# Patient Record
Sex: MALE | Race: WHITE | NOT HISPANIC OR LATINO | Employment: FULL TIME | URBAN - METROPOLITAN AREA
[De-identification: names, ages, dates, MRNs, and addresses within clinical notes are randomized per-mention and may not be internally consistent; named-entity substitution may affect disease eponyms.]

---

## 2020-02-11 ENCOUNTER — COMMUNICATION - HEALTHEAST (OUTPATIENT)
Dept: SCHEDULING | Facility: CLINIC | Age: 40
End: 2020-02-11

## 2020-02-11 ENCOUNTER — OFFICE VISIT - HEALTHEAST (OUTPATIENT)
Dept: FAMILY MEDICINE | Facility: CLINIC | Age: 40
End: 2020-02-11

## 2020-02-11 ENCOUNTER — COMMUNICATION - HEALTHEAST (OUTPATIENT)
Dept: TELEHEALTH | Facility: CLINIC | Age: 40
End: 2020-02-11

## 2020-02-11 DIAGNOSIS — H10.33 ACUTE CONJUNCTIVITIS OF BOTH EYES, UNSPECIFIED ACUTE CONJUNCTIVITIS TYPE: ICD-10-CM

## 2020-02-11 ASSESSMENT — MIFFLIN-ST. JEOR: SCORE: 1806.48

## 2020-12-11 ENCOUNTER — TRANSFERRED RECORDS (OUTPATIENT)
Dept: HEALTH INFORMATION MANAGEMENT | Facility: CLINIC | Age: 40
End: 2020-12-11

## 2021-06-04 VITALS
WEIGHT: 189.31 LBS | DIASTOLIC BLOOD PRESSURE: 80 MMHG | TEMPERATURE: 99 F | HEIGHT: 72 IN | SYSTOLIC BLOOD PRESSURE: 126 MMHG | HEART RATE: 64 BPM | BODY MASS INDEX: 25.64 KG/M2 | RESPIRATION RATE: 16 BRPM

## 2021-06-06 NOTE — PROGRESS NOTES
"Assessment / Impression     1. Acute conjunctivitis of both eyes, unspecified acute conjunctivitis type  tobramycin (TOBREX) 0.3 % ophthalmic solution         Plan:     Discussed with patient possible causes of conjunctivitis, including viral versus bacterial.  Discussed with patient that even with bacterial conjunctivitis, may improve without treatment with antibiotics, however given symptoms are worse in right eye today, prescription for ophthalmic tobramycin drops were given.  Discussed use, importance of washing hands to decrease possibility of spread to others.  Follow-up in 10 days if symptoms persist, sooner if they worsen.  Patient understands, agrees with plan.    Return in about 10 days (around 2/21/2020), or if symptoms worsen or fail to improve.    Subjective:      HPI: Sergio Bacon is a 39 y.o. male, who hasn't been seen in family medicine in over 3 years, who presents for \"possible pinkeye concerns\".  His symptoms started 3 days ago where he noted redness initially of both eyes with purulent drainage.  He has been using over-the-counter lubricant eye wetting drops, and yesterday he felt like his symptoms improved, however this morning woke up with right eye more erythematous than the left and significant yellow crustiness.  He has also had some cold symptoms.  Denies any eye pain, vision changes, or photophobia.  No fevers.      Medical History:     Patient Active Problem List   Diagnosis     Malignant Melanoma Of The Skin     Dysplastic Nevus     Allergies     Acute Bronchitis       History reviewed. No pertinent past medical history.    Past Surgical History:   Procedure Laterality Date     NO PAST SURGERIES         Current Medications:     Current Outpatient Medications   Medication Sig     multivitamin (MULTIPLE VITAMIN ORAL) Take by mouth.     tobramycin (TOBREX) 0.3 % ophthalmic solution Administer 1 drop to both eyes every 4 (four) hours for 10 days.       Family History:   History reviewed. " "No pertinent family history.    Review of Systems  All other systems reviewed and are negative.         Social History:     Social History     Tobacco Use   Smoking Status Never Smoker   Smokeless Tobacco Never Used     Social History     Social History Narrative    Works at [employer]         Objective:     /80 (Patient Site: Left Arm, Patient Position: Sitting, Cuff Size: Adult Large)   Pulse 64   Temp 99  F (37.2  C) (Oral)   Resp 16   Ht 6' 0.3\" (1.836 m)   Wt 189 lb 5 oz (85.9 kg)   BMI 25.46 kg/m    Physical Examination: General appearance - alert, well appearing, and in no distress  Eyes: extraocular eye movements intact  Ears: normal external ears, clear canals,  Left TM appears normal, with no redness or bulging noted.  Right TM appears normal, with no redness or bulging noted.  Mouth: mucous membranes moist, pharynx normal without lesions  Neck: supple, no significant adenopathy or thyromegaly  Lungs: clear to auscultation, no wheezes, rales or rhonchi, symmetric air entry  Heart: normal rate, regular rhythm, normal S1, S2, no murmurs.  Abdomen: soft, nontender, nondistended, no masses or organomegaly  Neurological: alert, oriented, normal speech, no focal findings or movement disorder noted.    Extremities: No edema, no clubbing or cyanosis  Psychiatric: Normal affect. Does not appear anxious or depressed.    No results found for this or any previous visit (from the past 168 hour(s)).      Yazmin Jenkins MD  2/11/2020  9:30 AM        "

## 2021-06-06 NOTE — TELEPHONE ENCOUNTER
Reason for Disposition    Patient wants to be seen    Protocols used: EYE - PUS OR [id number]

## 2021-06-06 NOTE — TELEPHONE ENCOUNTER
Woke up with pink eye Saturday.  Persistent.    Lid is bit swollen. Just puss in morning now.      Son had viral pink eye.    No pain or blurred vision.    Using lube eye drops.    Advised him medication cannot be sent in as he requests as he has never been seen by pcp or within [facility] system.    Prefers [facility] clinic.    Transferred to scheduling for an appointment.    Carmen Yoder RN  Triage Nurse Advisor

## 2021-08-21 ENCOUNTER — HEALTH MAINTENANCE LETTER (OUTPATIENT)
Age: 41
End: 2021-08-21

## 2021-10-16 ENCOUNTER — HEALTH MAINTENANCE LETTER (OUTPATIENT)
Age: 41
End: 2021-10-16

## 2021-12-27 ENCOUNTER — IMMUNIZATION (OUTPATIENT)
Dept: NURSING | Facility: CLINIC | Age: 41
End: 2021-12-27
Payer: COMMERCIAL

## 2021-12-27 PROCEDURE — 91300 PR COVID VAC PFIZER DIL RECON 30 MCG/0.3 ML IM: CPT

## 2021-12-27 PROCEDURE — 0004A PR COVID VAC PFIZER DIL RECON 30 MCG/0.3 ML IM: CPT

## 2022-09-25 ENCOUNTER — HEALTH MAINTENANCE LETTER (OUTPATIENT)
Age: 42
End: 2022-09-25

## 2023-04-08 ENCOUNTER — HOSPITAL ENCOUNTER (EMERGENCY)
Facility: HOSPITAL | Age: 43
Discharge: HOME | End: 2023-04-08
Attending: EMERGENCY MEDICINE | Admitting: EMERGENCY MEDICINE
Payer: COMMERCIAL

## 2023-04-08 VITALS
BODY MASS INDEX: 23.1 KG/M2 | HEIGHT: 74 IN | SYSTOLIC BLOOD PRESSURE: 130 MMHG | TEMPERATURE: 99.5 F | DIASTOLIC BLOOD PRESSURE: 94 MMHG | RESPIRATION RATE: 20 BRPM | OXYGEN SATURATION: 94 % | WEIGHT: 180 LBS | HEART RATE: 72 BPM

## 2023-04-08 DIAGNOSIS — T18.108A FOREIGN BODY IN ESOPHAGUS, INITIAL ENCOUNTER: ICD-10-CM

## 2023-04-08 LAB
ANION GAP SERPL CALCULATED.3IONS-SCNC: 11 MMOL/L (ref 7–15)
BASOPHILS # BLD AUTO: 0.1 10E3/UL (ref 0–0.2)
BASOPHILS NFR BLD AUTO: 1 %
BUN SERPL-MCNC: 15.4 MG/DL (ref 6–20)
CALCIUM SERPL-MCNC: 8.6 MG/DL (ref 8.6–10)
CHLORIDE SERPL-SCNC: 105 MMOL/L (ref 98–107)
CREAT SERPL-MCNC: 0.92 MG/DL (ref 0.67–1.17)
DEPRECATED HCO3 PLAS-SCNC: 23 MMOL/L (ref 22–29)
EOSINOPHIL # BLD AUTO: 0.1 10E3/UL (ref 0–0.7)
EOSINOPHIL NFR BLD AUTO: 1 %
ERYTHROCYTE [DISTWIDTH] IN BLOOD BY AUTOMATED COUNT: 11.9 % (ref 10–15)
GFR SERPL CREATININE-BSD FRML MDRD: >90 ML/MIN/1.73M2
GLUCOSE SERPL-MCNC: 100 MG/DL (ref 70–99)
HCT VFR BLD AUTO: 44.1 % (ref 40–53)
HGB BLD-MCNC: 15.2 G/DL (ref 13.3–17.7)
IMM GRANULOCYTES # BLD: 0 10E3/UL
IMM GRANULOCYTES NFR BLD: 0 %
LYMPHOCYTES # BLD AUTO: 1.2 10E3/UL (ref 0.8–5.3)
LYMPHOCYTES NFR BLD AUTO: 14 %
MCH RBC QN AUTO: 29.2 PG (ref 26.5–33)
MCHC RBC AUTO-ENTMCNC: 34.5 G/DL (ref 31.5–36.5)
MCV RBC AUTO: 85 FL (ref 78–100)
MONOCYTES # BLD AUTO: 0.5 10E3/UL (ref 0–1.3)
MONOCYTES NFR BLD AUTO: 5 %
NEUTROPHILS # BLD AUTO: 7 10E3/UL (ref 1.6–8.3)
NEUTROPHILS NFR BLD AUTO: 79 %
NRBC # BLD AUTO: 0 10E3/UL
NRBC BLD AUTO-RTO: 0 /100
PLATELET # BLD AUTO: 207 10E3/UL (ref 150–450)
POTASSIUM SERPL-SCNC: 4 MMOL/L (ref 3.4–5.3)
RBC # BLD AUTO: 5.21 10E6/UL (ref 4.4–5.9)
SODIUM SERPL-SCNC: 139 MMOL/L (ref 136–145)
WBC # BLD AUTO: 8.8 10E3/UL (ref 4–11)

## 2023-04-08 PROCEDURE — 96360 HYDRATION IV INFUSION INIT: CPT

## 2023-04-08 PROCEDURE — 96374 THER/PROPH/DIAG INJ IV PUSH: CPT

## 2023-04-08 PROCEDURE — 80048 BASIC METABOLIC PNL TOTAL CA: CPT | Performed by: EMERGENCY MEDICINE

## 2023-04-08 PROCEDURE — 258N000003 HC RX IP 258 OP 636: Performed by: EMERGENCY MEDICINE

## 2023-04-08 PROCEDURE — 99284 EMERGENCY DEPT VISIT MOD MDM: CPT | Mod: 25

## 2023-04-08 PROCEDURE — 36415 COLL VENOUS BLD VENIPUNCTURE: CPT | Performed by: EMERGENCY MEDICINE

## 2023-04-08 PROCEDURE — 250N000013 HC RX MED GY IP 250 OP 250 PS 637: Performed by: EMERGENCY MEDICINE

## 2023-04-08 PROCEDURE — 85004 AUTOMATED DIFF WBC COUNT: CPT | Performed by: EMERGENCY MEDICINE

## 2023-04-08 PROCEDURE — 96361 HYDRATE IV INFUSION ADD-ON: CPT

## 2023-04-08 PROCEDURE — 250N000011 HC RX IP 250 OP 636: Performed by: EMERGENCY MEDICINE

## 2023-04-08 RX ADMIN — GLUCAGON HYDROCHLORIDE 1 MG: 1 INJECTION, POWDER, FOR SOLUTION INTRAMUSCULAR; INTRAVENOUS; SUBCUTANEOUS at 18:06

## 2023-04-08 RX ADMIN — SODIUM CHLORIDE 1000 ML: 9 INJECTION, SOLUTION INTRAVENOUS at 18:07

## 2023-04-08 RX ADMIN — ANTACID/ANTIFLATULENT 4 G: 380; 550; 10; 10 GRANULE, EFFERVESCENT ORAL at 18:12

## 2023-04-08 ASSESSMENT — ACTIVITIES OF DAILY LIVING (ADL)
ADLS_ACUITY_SCORE: 35

## 2023-04-08 NOTE — ED PROVIDER NOTES
EMERGENCY DEPARTMENT ENCOUNTER      NAME: Sergio Bacon  AGE: 42 year old male  YOB: 1980  MRN: [id number]  EVALUATION DATE & TIME: No admission date for patient encounter.    PCP: Yamila Alonso    ED PROVIDER: Madeline Carroll M.D.      Chief Complaint   Patient presents with     Swallowed Foreign Body         FINAL IMPRESSION:  1. Foreign body in esophagus, initial encounter          ED COURSE & MEDICAL DECISION MAKING:    ED Course as of 04/08/23 2328   Sat Apr 08, 2023   1748 Pt with approx 4pm sense of esophageal FB while swallowing chicken and can localize it, no respiratory distress, normal VS, not tolerating saliva. RN on break, covering RN busy, thus will attempt soda in lieu of EZ gas now, GI paged   1800 EZ gas ineffective, patient tried drinking soda while jumping up and down without relief   1818 I spoke with Dr White from [facility] who will take patient to OR and either PACU vs. ER discharge, at [place] now and will take a few min to get here likely   2102 Pt unable to go to OR as all Ors currently occupied until 10pm, booked for 10pm   2314 Pt now notes he feels like the food bolus has gone down and STILL waiting for OR space for endsocopy given ongoing cases, will PO challenge now.   2321 Pt tolerating PO which is reassuring, discharged with GI follow up to discuss whether he may need an endoscopy in the future and [facility] paged to update that patient's food bolus self resolved. Patient discharged after being provided with extensive anticipatory guidance and given return precautions, importance of PMD follow-up emphasized.    2328 I spoke with Dr White who will personally reach out to patient through office to make sure he is able to schedule upper endoscopy       Pertinent Labs & Imaging studies reviewed. (See chart for details)    N95 worn  A face shield was worn also  COVID PPE    Medical Decision Making    History:    Supplemental history from: Documented in chart, if  applicable    External Record(s) reviewed: Documented in chart, if applicable.    Work Up:    Chart documentation includes differential considered and any EKGs or imaging independently interpreted by provider, where specified.    In additional to work up documented, I considered the following work up: Documented in chart, if applicable.    External consultation:    Discussion of management with another provider: Gastroenterology    Complicating factors:    Care impacted by chronic illness: N/A    Care affected by social determinants of health: N/A    Disposition considerations: Discharge. I recommended the patient continue their current prescription strength medication(s): multivitamin. I considered admission, but discharged patient after significant clinical improvement.        At the conclusion of the encounter I discussed the results of all of the tests and the disposition. The questions were answered. The patient or family acknowledged understanding and was agreeable with the care plan.     MEDICATIONS GIVEN IN THE EMERGENCY:  Medications   sod bicarbonate-citric acid-simethicone (EZ GAS) 2.21-1.53-0.04 g packet 4 g (4 g Oral $Given 4/8/23 1812)   glucagon injection 1 mg (1 mg Intravenous $Given 4/8/23 1806)   0.9% sodium chloride BOLUS (0 mLs Intravenous Stopped 4/8/23 1915)       NEW PRESCRIPTIONS STARTED AT TODAY'S ER VISIT  New Prescriptions    No medications on file          =================================================================    HPI      Sergio Bacon is a 42 year old male with no pertinent PMHx who presents to the ED today by walking for swallowed foreign body.     At ~4:00 PM today, patient was grilling chicken and when he went to taste it, the piece of chicken did not go all the way down. Notes that every time he tries to swallow, it feels as if he needs to throw up. States this has never happened before. Otherwise denies shortness of breath or any other complaints at this time. Denies a  "history of acid reflux or any other medical issues.     REVIEW OF SYSTEMS   All other systems reviewed and are negative except as noted above in HPI.    PAST MEDICAL HISTORY:  History reviewed. No pertinent past medical history.    PAST SURGICAL HISTORY:  Past Surgical History:   Procedure Laterality Date     NO PAST SURGERIES         CURRENT MEDICATIONS:    multivitamin (MULTIPLE VITAMIN ORAL)        ALLERGIES:  No Known Allergies    FAMILY HISTORY:  History reviewed. No pertinent family history.    SOCIAL HISTORY:   Social History     Socioeconomic History     Marital status:    Tobacco Use     Smoking status: Never     Smokeless tobacco: Never   Substance and Sexual Activity     Alcohol use: Yes     Comment: Alcoholic Drinks/day: occasional     Drug use: Never   Social History Narrative    Works at [employer]       VITALS:  Patient Vitals for the past 24 hrs:   BP Temp Temp src Pulse Resp SpO2 Height Weight   04/08/23 2130 (!) 130/94 -- -- 72 -- 94 % -- --   04/08/23 2100 136/89 -- -- 67 -- 93 % -- --   04/08/23 1930 (!) 140/87 -- -- 76 -- 94 % -- --   04/08/23 1914 -- -- -- 73 -- 96 % -- --   04/08/23 1900 (!) 149/84 -- -- 75 -- 95 % -- --   04/08/23 1822 -- -- -- 82 -- 97 % -- --   04/08/23 1818 (!) 168/81 -- -- -- -- -- -- --   04/08/23 1817 -- -- -- 95 -- 96 % -- --   04/08/23 1815 -- -- -- 98 -- 96 % -- --   04/08/23 1810 (!) 177/98 -- -- 110 -- 95 % -- --   04/08/23 1724 (!) 141/83 99.5  F (37.5  C) Oral 92 20 96 % 1.88 m (6' 2\") 81.6 kg (180 lb)       PHYSICAL EXAM    GENERAL: Awake, alert.  In no acute distress.   HEENT: Normocephalic, atraumatic.  Pupils equal, round and reactive.  Conjunctiva normal.  EOMI.  NECK: No stridor or apparent deformity.  PULMONARY: Symmetrical breath sounds without distress.  Lungs clear to auscultation bilaterally without wheezes, rhonchi or rales.  CARDIO: Regular rate and rhythm.  No significant murmur, rub or gallop.  Radial pulses strong and " symmetrical.  ABDOMINAL: Abdomen soft, non-distended and non-tender to palpation.  No CVAT, no palpable hepatosplenomegaly.  EXTREMITIES: No lower extremity swelling or edema.    NEURO: Alert and oriented to person, place and time.  Cranial nerves grossly intact.  No focal motor deficit.  PSYCH: Normal mood and affect  SKIN: No rashes      LAB:  All pertinent labs reviewed and interpreted.  Results for orders placed or performed during the hospital encounter of 04/08/23   Basic metabolic panel   Result Value Ref Range    Sodium 139 136 - 145 mmol/L    Potassium 4.0 3.4 - 5.3 mmol/L    Chloride 105 98 - 107 mmol/L    Carbon Dioxide (CO2) 23 22 - 29 mmol/L    Anion Gap 11 7 - 15 mmol/L    Urea Nitrogen 15.4 6.0 - 20.0 mg/dL    Creatinine 0.92 0.67 - 1.17 mg/dL    Calcium 8.6 8.6 - 10.0 mg/dL    Glucose 100 (H) 70 - 99 mg/dL    GFR Estimate >90 >60 mL/min/1.73m2   CBC with platelets and differential   Result Value Ref Range    WBC Count 8.8 4.0 - 11.0 10e3/uL    RBC Count 5.21 4.40 - 5.90 10e6/uL    Hemoglobin 15.2 13.3 - 17.7 g/dL    Hematocrit 44.1 40.0 - 53.0 %    MCV 85 78 - 100 fL    MCH 29.2 26.5 - 33.0 pg    MCHC 34.5 31.5 - 36.5 g/dL    RDW 11.9 10.0 - 15.0 %    Platelet Count 207 150 - 450 10e3/uL    % Neutrophils 79 %    % Lymphocytes 14 %    % Monocytes 5 %    % Eosinophils 1 %    % Basophils 1 %    % Immature Granulocytes 0 %    NRBCs per 100 WBC 0 <1 /100    Absolute Neutrophils 7.0 1.6 - 8.3 10e3/uL    Absolute Lymphocytes 1.2 0.8 - 5.3 10e3/uL    Absolute Monocytes 0.5 0.0 - 1.3 10e3/uL    Absolute Eosinophils 0.1 0.0 - 0.7 10e3/uL    Absolute Basophils 0.1 0.0 - 0.2 10e3/uL    Absolute Immature Granulocytes 0.0 <=0.4 10e3/uL    Absolute NRBCs 0.0 10e3/uL           Yamila DEMPSEY, am serving as a scribe to document services personally performed by Dr. Madeline Carroll based on my observation and the provider's statements to me. I, Madeline Carroll MD attest that Yamila Granados is acting in a scribe capacity, has  observed my performance of the services and has documented them in accordance with my direction.     Madeline Carroll MD  04/08/23 9460       Madeline Carroll MD  04/08/23 2825

## 2023-04-08 NOTE — ED TRIAGE NOTES
Pt arrives c/o chicken stuck in throat. States he was grilling chicken when he went to taste it and chicken did not go all the way down. Any water he drinks comes right back up. Airway intact.

## 2023-11-19 ENCOUNTER — HEALTH MAINTENANCE LETTER (OUTPATIENT)
Age: 43
End: 2023-11-19

## 2023-12-12 ASSESSMENT — ENCOUNTER SYMPTOMS
PALPITATIONS: 0
CONSTIPATION: 0
FREQUENCY: 0
JOINT SWELLING: 0
HEARTBURN: 0
WEAKNESS: 0
ABDOMINAL PAIN: 0
COUGH: 0
HEADACHES: 0
NAUSEA: 0
EYE PAIN: 0
ARTHRALGIAS: 0
SORE THROAT: 0
PARESTHESIAS: 0
DIARRHEA: 0
MYALGIAS: 0
HEMATURIA: 0
NERVOUS/ANXIOUS: 0
DIZZINESS: 0
DYSURIA: 0
HEMATOCHEZIA: 0
CHILLS: 0
FEVER: 0
SHORTNESS OF BREATH: 0

## 2023-12-12 NOTE — COMMUNITY RESOURCES LIST (ENGLISH)
12/12/2023   [facility]  N/A  For questions about this resource list or additional care needs, please contact your primary care clinic or care manager.  Phone: [phone]   Email: N/A   Address: [address] [place] [address]   Hours: N/A        Hotlines and Helplines       Hotline - Housing crisis  1  [facility] Distance: 9.58 miles      Phone/Virtual   [address] [place] [address]  Language: English  Hours: Mon - Sun Open 24 Hours   Phone: (768) 847-7050 Email: communications@[facility].org Website: https://[facility]-[place].org/oursaviourshousing/     2  [facility] Distance: 10.75 miles      Phone/Virtual   [address] [place] [address]  Language: English  Hours: Mon - Sun Open 24 Hours   Phone: (307) 430-7125 Email: info@[facility] Website: http://www.[facility].org          Housing       Coordinated Entry access point  3  [facility] Distance: 6.64 miles      Phone/Virtual   [address] [address]  Language: English  Hours: Mon - Fri 8:30 AM - 12:00 PM , Mon - Fri 1:00 PM - 4:00 PM  Fees: Free   Phone: (975) 289-6529 Ext.2 Email: kaylan@[facility].org Website: https://www.[facility].org/usn/     4  [facility] Human [facility] - Coordinated Access to Housing and Shelter ([facility]) - Coordinated Access - Coordinated Entry access point Distance: 7.86 miles      In-Person, Phone/Virtual   [address] [place] [address]  Language: English  Hours: Mon - Fri 8:00 AM - 4:30 PM  Fees: Free   Phone: (366) 570-3956 Website: https://www.[facility]./residents/assistance-support/assistance/housing-services-support     Drop-in center or day shelter  5  [facility] Distance: 8.17 miles      In-Person   [address] [place] [address]  Language: English  Hours: Mon - Fri 9:00 AM - 4:00 PM  Fees: Free   Phone: (528)  090-9668 Email: frontcherryk@[employer].org Website: http://[employer].org     6  [facility] Distance: 9.14 miles      In-Person   [address] [place] [address]  Language: English, Hmong, Honduran, Mauritian  Hours: Mon - Thu 8:30 AM - 4:30 PM , Sat - Sun 9:00 AM - 12:00 PM  Fees: Free   Phone: (293) 356-6939 Email: info@[employer] Website: https://[employer].org/     Housing search assistance  7  [facility] - Mental Health Crisis Housing Search Assistance Distance: 7.91 miles      In-Person, Phone/Virtual   [address] [facility] [address] [place] [address]  Language: English  Hours: Mon - Tue 8:00 AM - 4:30 PM , Wed 8:00 AM - 6:00 PM , Thu - Fri 8:00 AM - 4:30 PM  Fees: Free   Phone: (511) 532-2900 Email: [facility]@co.[facility]. Website: https://www.[facility]./residents/health-medical/clinics-services/mental-health/adult-mental-health     8  Neighborhood Assistance [employer] [facility] [employer] Distance: 8.14 miles      Phone/Virtual   [address] [place] [address]  Language: English, Mauritian  Hours: Mon - Fri 9:00 AM - 5:00 PM  Fees: Free   Phone: (868) 269-2842 Email: services@[employer] Website: https://www.[employer]     Shelter for families  9   [facility] Distance: 9.43 miles      In-Person   [address] [place] [address]  Language: English  Hours: Mon - Fri 3:00 PM - 9:00 AM , Sat - Sun Open 24 Hours  Fees: Free   Phone: (494) 465-4166 Ext.1 Website: https://www.[facility].org/2020/07/03/emergency-family-shelter/     Shelter for individuals  10  [facility] and Human [facility] - Coordinated Access to Housing and Shelter ([facility]) - Coordinated Access - Emergency housing Distance: 7.86 miles      In-Person, Phone/Virtual   [place] [address]  Language: English  Hours: Mon - Fri 8:00 AM - 4:30 PM  Fees: Free   Phone: (226) 975-3889 Website:  https://www.[facility]./residents/assistance-support/assistance/housing-services-support     11  [facility] Distance: 8.55 miles      In-Person   [address] [place] [address]  Language: English  Hours: Mon - Sun 5:00 PM - 10:00 AM  Fees: Free, Self Pay   Phone: (228) 427-7244 Email: info@[employer] Website: https://www.[employer]/locations/[facility]/          Important Numbers & Websites       Emergency Services   911  [facility] Services   311  Poison Control   (872) 893-2578  Suicide Prevention Lifeline   (478) 780-2859 (TALK)  Child Abuse Hotline   (795) 746-7393 (4-A-Child)  Sexual Assault Hotline   (437) 306-2304 (HOPE)  National Runaway Safeline   (193) 718-3704 (RUNAWAY)  All-Options Talkline   (248) 544-1267  Substance Abuse Referral   (425) 712-4344 (HELP)

## 2023-12-19 ENCOUNTER — OFFICE VISIT (OUTPATIENT)
Dept: FAMILY MEDICINE | Facility: CLINIC | Age: 43
End: 2023-12-19
Payer: COMMERCIAL

## 2023-12-19 VITALS
OXYGEN SATURATION: 100 % | DIASTOLIC BLOOD PRESSURE: 86 MMHG | BODY MASS INDEX: 23.24 KG/M2 | HEIGHT: 73 IN | SYSTOLIC BLOOD PRESSURE: 119 MMHG | WEIGHT: 175.38 LBS | RESPIRATION RATE: 16 BRPM | HEART RATE: 77 BPM | TEMPERATURE: 98.5 F

## 2023-12-19 DIAGNOSIS — Z11.4 SCREENING FOR HIV (HUMAN IMMUNODEFICIENCY VIRUS): ICD-10-CM

## 2023-12-19 DIAGNOSIS — Z13.1 ENCOUNTER FOR SCREENING EXAMINATION FOR IMPAIRED GLUCOSE REGULATION AND DIABETES MELLITUS: ICD-10-CM

## 2023-12-19 DIAGNOSIS — M25.512 ACUTE PAIN OF LEFT SHOULDER: ICD-10-CM

## 2023-12-19 DIAGNOSIS — Z00.00 ANNUAL PHYSICAL EXAM: Primary | ICD-10-CM

## 2023-12-19 DIAGNOSIS — Z13.220 LIPID SCREENING: ICD-10-CM

## 2023-12-19 DIAGNOSIS — Z80.0 FAMILY HISTORY OF COLON CANCER: ICD-10-CM

## 2023-12-19 DIAGNOSIS — Z85.820 HX OF MALIGNANT MELANOMA: ICD-10-CM

## 2023-12-19 PROCEDURE — 86706 HEP B SURFACE ANTIBODY: CPT | Performed by: FAMILY MEDICINE

## 2023-12-19 PROCEDURE — 99386 PREV VISIT NEW AGE 40-64: CPT | Performed by: FAMILY MEDICINE

## 2023-12-19 PROCEDURE — 87389 HIV-1 AG W/HIV-1&-2 AB AG IA: CPT | Performed by: FAMILY MEDICINE

## 2023-12-19 PROCEDURE — 36415 COLL VENOUS BLD VENIPUNCTURE: CPT | Performed by: FAMILY MEDICINE

## 2023-12-19 PROCEDURE — 80053 COMPREHEN METABOLIC PANEL: CPT | Performed by: FAMILY MEDICINE

## 2023-12-19 PROCEDURE — 80061 LIPID PANEL: CPT | Performed by: FAMILY MEDICINE

## 2023-12-19 PROCEDURE — 99213 OFFICE O/P EST LOW 20 MIN: CPT | Mod: 25 | Performed by: FAMILY MEDICINE

## 2023-12-19 RX ORDER — OMEPRAZOLE 40 MG/1
CAPSULE, DELAYED RELEASE ORAL
COMMUNITY
Start: 2023-06-12

## 2023-12-19 RX ORDER — NAPROXEN 500 MG/1
500 TABLET ORAL 2 TIMES DAILY WITH MEALS
Qty: 60 TABLET | Refills: 1 | Status: SHIPPED | OUTPATIENT
Start: 2023-12-19

## 2023-12-19 ASSESSMENT — ENCOUNTER SYMPTOMS
HEADACHES: 0
DYSURIA: 0
DIZZINESS: 0
PARESTHESIAS: 0
FEVER: 0
PALPITATIONS: 0
COUGH: 0
EYE PAIN: 0
SORE THROAT: 0
CONSTIPATION: 0
SHORTNESS OF BREATH: 0
MYALGIAS: 0
WEAKNESS: 0
NAUSEA: 0
CHILLS: 0
JOINT SWELLING: 0
HEMATURIA: 0
ABDOMINAL PAIN: 0
DIARRHEA: 0
HEMATOCHEZIA: 0
NERVOUS/ANXIOUS: 0
FREQUENCY: 0
HEARTBURN: 0
ARTHRALGIAS: 0

## 2023-12-19 NOTE — PROGRESS NOTES
SUBJECTIVE:   Kye is a 43 year old, presenting for the following:  Physical and Shoulder Pain (Left shoulder)    Chief Complaints and History of Present Illnesses   Patient presents with    Physical    Shoulder Pain     Left shoulder            12/19/2023    10:16 AM   Additional Questions   Roomed by Rut LOPEZ CMA   Accompanied by Self       Healthy Habits:     Getting at least 3 servings of Calcium per day:  Yes    Bi-annual eye exam:  NO    Dental care twice a year:  Yes    Sleep apnea or symptoms of sleep apnea:  None    Diet:  Regular (no restrictions)    Frequency of exercise:  1 day/week    Duration of exercise:  Less than 15 minutes    Taking medications regularly:  Yes    Medication side effects:  None    Additional concerns today:  No  Shoulder Pain  Pertinent negatives include no abdominal pain, arthralgias, chest pain, chills, congestion, coughing, fever, headaches, joint swelling, myalgias, nausea, rash, sore throat or weakness.       Today's PHQ-2 Score:       12/19/2023    10:02 AM   PHQ-2 ( 1999 Pfizer)   Q1: Little interest or pleasure in doing things 0   Q2: Feeling down, depressed or hopeless 0   PHQ-2 Score 0   Q1: Little interest or pleasure in doing things Not at all   Q2: Feeling down, depressed or hopeless Not at all   PHQ-2 Score 0       LEFT SHOULDER PAIN: Prior injury at 16 yo, thinks had MRI to diagnose partial tear. No surgery. Was told to expect surgery around 25 yo. Concerned about impingement. Has been trying some home exercises. Similar to a friend's symptoms. Has pain with abduction,  degrees the worst.        HX MELANOMA: Follows with dermatology, Dr. Davion Santacruz. Diagnosed 2008.   Clear margins. Has had a few other biopsies.        ALCOHOL: one glass of wine per night.        BLOOD PRESSURE: Usually high when he arrives, then normalize.    BP Readings from Last 6 Encounters:   12/19/23 (!) 133/93   04/08/23 (!) 130/94   02/11/20 126/80     SODH: Questionnaire positive  "for housing stability concerns though Kye states no concerns there.  Suspects iPad  error.    HEALTH MAINTENANCE:   - HIV: will screen   - Hep C: declines   - Hep B Vaccine: will check for antibodies      The ASCVD Risk score (Jenni PEGUERO, et al., 2019) failed to calculate for the following reasons:    Cannot find a previous HDL lab    Cannot find a previous total cholesterol lab     Lab Results   Component Value Date    CHOL 239 10/26/2012     Lab Results   Component Value Date    HDL 52 10/26/2012     Lab Results   Component Value Date     10/26/2012     Lab Results   Component Value Date    TRIG 92 10/26/2012     No results found for: \"CHOLHDLRATIO\"    Have you ever done Advance Care Planning? (For example, a Health Directive, POLST, or a discussion with a medical provider or your loved ones about your wishes): No, advance care planning information given to patient to review.  Patient declined advance care planning discussion at this time.    Social History     Tobacco Use    Smoking status: Never     Passive exposure: Never    Smokeless tobacco: Former     Types: Chew   Substance Use Topics    Alcohol use: Yes     Comment: Alcoholic Drinks/day: occasional             12/12/2023     9:32 AM   Alcohol Use   Prescreen: >3 drinks/day or >7 drinks/week? Yes   AUDIT SCORE  4         12/12/2023     9:32 AM   AUDIT - Alcohol Use Disorders Identification Test - Reproduced from the World Health Organization Audit 2001 (Second Edition)   1.  How often do you have a drink containing alcohol? 2 to 3 times a week   2.  How many drinks containing alcohol do you have on a typical day when you are drinking? 1 or 2   3.  How often do you have five or more drinks on one occasion? Never   4.  How often during the last year have you found that you were not able to stop drinking once you had started? Never   5.  How often during the last year have you failed to do what was normally expected of you because of drinking? " "Never   6.  How often during the last year have you needed a first drink in the morning to get yourself going after a heavy drinking session? Never   7.  How often during the last year have you had a feeling of guilt or remorse after drinking? Less than monthly   8.  How often during the last year have you been unable to remember what happened the night before because of your drinking? Never   9.  Have you or someone else been injured because of your drinking? No   10. Has a relative, friend, doctor or other health care worker been concerned about your drinking or suggested you cut down? No   TOTAL SCORE 4       Last PSA: No results found for: \"PSA\"    Reviewed orders with patient. Reviewed health maintenance and updated orders accordingly - Yes      Reviewed and updated as needed this visit by clinical staff   Tobacco  Allergies  Meds              Reviewed and updated as needed this visit by Provider   Tobacco  Allergies  Meds  Problems  Med Hx  Surg Hx  Fam Hx             Review of Systems   Constitutional:  Negative for chills and fever.   HENT:  Negative for congestion, ear pain, hearing loss and sore throat.    Eyes:  Negative for pain and visual disturbance.   Respiratory:  Negative for cough and shortness of breath.    Cardiovascular:  Negative for chest pain, palpitations and peripheral edema.   Gastrointestinal:  Negative for abdominal pain, constipation, diarrhea, heartburn, hematochezia and nausea.   Genitourinary:  Negative for dysuria, frequency, genital sores, hematuria, impotence, penile discharge and urgency.   Musculoskeletal:  Negative for arthralgias, joint swelling and myalgias.   Skin:  Negative for rash.   Neurological:  Negative for dizziness, weakness, headaches and paresthesias.   Psychiatric/Behavioral:  Negative for mood changes. The patient is not nervous/anxious.          OBJECTIVE:   /86 (BP Location: Left arm, Patient Position: Sitting, Cuff Size: Adult Regular)   " "Pulse 77   Temp 98.5  F (36.9  C) (Oral)   Resp 16   Ht 1.848 m (6' 0.75\")   Wt 79.5 kg (175 lb 6 oz)   SpO2 100%   BMI 23.30 kg/m      Physical Exam  GENERAL: healthy, alert and no distress  EYES: Eyes grossly normal to inspection, PERRL and conjunctivae and sclerae normal  HENT: ear canals and TM's normal, nose and mouth without ulcers or lesions  NECK: no adenopathy, no asymmetry, masses, or scars and thyroid normal to palpation  RESP: lungs clear to auscultation - no rales, rhonchi or wheezes  CV: regular rate and rhythm, normal S1 S2, no S3 or S4, no murmur, click or rub, no peripheral edema and peripheral pulses strong  ABDOMEN: soft, nontender, no hepatosplenomegaly, no masses and bowel sounds normal  MS: Active range of motion full with flexion, abduction, extension, internal and external rotation.  Pain exacerbated with abduction around 80 degrees.  Negative empty can test.  SKIN: no suspicious lesions or rashes  NEURO: Normal strength and tone, mentation intact and speech normal  PSYCH: mentation appears normal, affect normal/bright        ASSESSMENT/PLAN:     1. Annual physical exam  - REVIEW OF HEALTH MAINTENANCE PROTOCOL ORDERS  - Hepatitis B Surface Antibody    Reviewed health history and health maintenance recommendations.  Declines hepatitis C screening is no risk and does not want to be charged.  Will update HIV screening today.  Denies any risk factors for blood or body fluid exposure.  Up-to-date vaccines.    2. Family history of colon cancer - Q5Y colonoscopy  Mother diagnosed with colon cancer mid 40s.  He has had colonoscopy, on a every 5 year plan.  TAMMY signed for last colonoscopy report.    3. Acute pain of left shoulder  - naproxen (NAPROSYN) 500 MG tablet; Take 1 tablet (500 mg) by mouth 2 times daily (with meals)  Dispense: 60 tablet; Refill: 1  - Physical Therapy Referral; Future    Symptoms suspicious for rotator cuff tendinitis, supraspinatus versus impingement of the " supraspinatus.  Recommend we start with scheduled NSAIDs, avoiding aggravating activities, and physical therapy.  If symptoms not improving, recommend referral to orthopedics.  He can send MyChart message with update and we can place referral order.    4. Hx of malignant melanoma  Status post excision, clear margins.  Follows with dermatology for skin checks.    5. Screening for HIV (human immunodeficiency virus)  - HIV Antigen Antibody Combo    6. Lipid screening  - Lipid panel reflex to direct LDL Non-fasting  - Comprehensive metabolic panel (BMP + Alb, Alk Phos, ALT, AST, Total. Bili, TP)    7. Encounter for screening examination for impaired glucose regulation and diabetes mellitus  - Comprehensive metabolic panel (BMP + Alb, Alk Phos, ALT, AST, Total. Bili, TP)      Patient has been advised of split billing requirements and indicates understanding: Yes      COUNSELING:   Reviewed preventive health counseling, as reflected in patient instructions        He reports that he has never smoked. He has never been exposed to tobacco smoke. He has quit using smokeless tobacco.  His smokeless tobacco use included chew.            Joy Garvin, DO  [facility]

## 2023-12-19 NOTE — COMMUNITY RESOURCES LIST (ENGLISH)
12/19/2023   [facility]  N/A  For questions about this resource list or additional care needs, please contact your primary care clinic or care manager.  Phone: [phone]   Email: N/A   Address: [address] [place] [address]   Hours: N/A        Hotlines and Helplines       Hotline - Housing crisis  1  [facility] Distance: 9.58 miles      Phone/Virtual   [address] [place] [address]  Language: English  Hours: Mon - Sun Open 24 Hours   Phone: (424) 282-4742 Email: communications@[facility].org Website: https://osc[facility]-[place].org/oursaviourshousing/     2  [facility] Distance: 10.75 miles      Phone/Virtual   [address] [place] [address]  Language: English  Hours: Mon - Sun Open 24 Hours   Phone: (445) 527-8911 Email: info@[facility] Website: http://www.[facility].org          Housing       Coordinated Entry access point  3  [facility]  [facility] Distance: 6.64 miles      Phone/Virtual   [address] [address]  Language: English  Hours: Mon - Fri 8:30 AM - 12:00 PM , Mon - Fri 1:00 PM - 4:00 PM  Fees: Free   Phone: (427) 660-2181 Ext.2 Email: kaylan@[facility].org Website: https://www.[facility].org/usn/     4  [facility] - Coordinated Access to Housing and Shelter ([facility]) - Coordinated Access - Coordinated Entry access point Distance: 7.86 miles      In-Person, Phone/Virtual   [address] [place] [address]  Language: English  Hours: Mon - Fri 8:00 AM - 4:30 PM  Fees: Free   Phone: (745) 383-4904 Website: https://www.[facility]./residents/assistance-support/assistance/housing-services-support     Drop-in center or day shelter  5  [facility] Distance: 8.17 miles      In-Person   [address] [place] [address]  Language: English  Hours: Mon - Fri 9:00 AM - 4:00 PM  Fees: Free   Phone: (932)  779-4518 Email: frontcherryk@[employer].org Website: http://[employer].org     6  [facility] Distance: 9.14 miles      In-Person   [address] [place] [address] [place] [address]  Language: English, Hmong, Beninese, Iranian  Hours: Mon - Thu 8:30 AM - 4:30 PM , Sat - Sun 9:00 AM - 12:00 PM  Fees: Free   Phone: (552) 724-7781 Email: info@[employer] Website: https://[employer].org/     Housing search assistance  7  [facility] [place] - Mental Health Crisis Housing Search Assistance Distance: 7.91 miles      In-Person, Phone/Virtual   [address] [facility] [address] [place] [address]  Language: English  Hours: Mon - Tue 8:00 AM - 4:30 PM , Wed 8:00 AM - 6:00 PM , Thu - Fri 8:00 AM - 4:30 PM  Fees: Free   Phone: (874) 970-9377 Email: [facility]@co.[facility]. Website: https://www.[facility]./residents/health-medical/clinics-services/mental-health/adult-mental-health     8  Neighborhood Assistance [employer] [facility] Distance: 8.14 miles      Phone/Virtual   [address] [place] [address]  Language: English, Iranian  Hours: Mon - Fri 9:00 AM - 5:00 PM  Fees: Free   Phone: (255) 789-4310 Email: services@[employer] Website: https://www.[employer]     Shelter for families  9   [facility] Distance: 9.43 miles      In-Person   [address] [place] [address]  Language: English  Hours: Mon - Fri 3:00 PM - 9:00 AM , Sat - Sun Open 24 Hours  Fees: Free   Phone: (511) 576-2089 Ext.1 Website: https://www.[facility].org/2020/07/03/emergency-family-shelter/     Shelter for individuals  10  [facility] and [facility] - Coordinated Access to Housing and Shelter ([facility]) - Coordinated Access - Emergency housing Distance: 7.86 miles      In-Person, Phone/[place] [address]  Language: English  Hours: Mon - Fri 8:00 AM - 4:30 PM  Fees: Free   Phone: (759) 730-9338 Website:  https://www.[facility]./residents/assistance-support/assistance/housing-services-support     11  [facility] - [facility] - [facility] Distance: 8.55 miles      In-Person   [address] [place] [address]  Language: English  Hours: Mon - Sun 5:00 PM - 10:00 AM  Fees: Free, Self Pay   Phone: (949) 612-4509 Email: info@[employer] Website: https://www.[employer]/locations/[facility]aint-paul/          Important Numbers & Websites       Emergency Services   911  [facility] Services   311  Poison Control   (473) 545-7154  Suicide Prevention Lifeline   (899) 135-7166 (TALK)  Child Abuse Hotline   (712) 939-1727 (4-A-Child)  Sexual Assault Hotline   (884) 254-8794 (HOPE)  National Runaway Safeline   (805) 464-1494 (RUNAWAY)  All-Options Talkline   (340) 846-5979  Substance Abuse Referral   (330) 767-9151 (HELP)

## 2023-12-19 NOTE — COMMUNITY RESOURCES LIST (ENGLISH)
12/19/2023   [facility]  N/A  For questions about this resource list or additional care needs, please contact your primary care clinic or care manager.  Phone: [phone]   Email: N/A   Address: [address] [place] [address]   Hours: N/A        Hotlines and Helplines       Hotline - Housing crisis  1  [facility] Distance: 9.58 miles      Phone/Virtual   [address] [place] [address]  Language: English  Hours: Mon - Sun Open 24 Hours   Phone: (680) 735-6175 Email: communications@[facility].org Website: https://[facility]-[place].org/oursaviourshousing/     2  [facility] Distance: 10.75 miles      Phone/Virtual   [address] [place] [address]  Language: English  Hours: Mon - Sun Open 24 Hours   Phone: (214) 242-6999 Email: info@[facility] Website: http://www.[facility].org          Housing       Coordinated Entry access point  3  [facility]  [facility] Distance: 6.64 miles      Phone/Virtual   [address] [address]  Language: English  Hours: Mon - Fri 8:30 AM - 12:00 PM , Mon - Fri 1:00 PM - 4:00 PM  Fees: Free   Phone: (993) 541-1452 Ext.2 Email: kaylan@[facility].[employer] [employer].org Website: https://www.[facility].org/usn/     4  [facility] Human [facility] - Coordinated Access to Housing and Shelter ([facility]) - Coordinated Access - Coordinated Entry access point Distance: 7.86 miles      In-Person, Phone/Virtual   [address] [place] [address]  Language: English  Hours: Mon - Fri 8:00 AM - 4:30 PM  Fees: Free   Phone: (131) 370-4306 Website: https://www.[facility]./residents/assistance-support/assistance/housing-services-support     Drop-in center or day shelter  5  [facility] Distance: 8.17 miles      In-Person   [address] [place] [address]  Language: English  Hours: Mon - Fri 9:00 AM - 4:00 PM  Fees: Free   Phone: (493)  712-4682 Email: frontcherryk@[employer].org Website: http://[employer].org     6  [facility] Distance: 9.14 miles      In-Person   [address] [place] [address]  Language: English, Hmong, Montenegrin, Slovenian  Hours: Mon - Thu 8:30 AM - 4:30 PM , Sat - Sun 9:00 AM - 12:00 PM  Fees: Free   Phone: (378) 336-3745 Email: info@[employer] Website: https://[employer].org/     Housing search assistance  7  [facility] [place] - Mental Health Crisis Housing Search Assistance Distance: 7.91 miles      In-Person, Phone/Virtual   [address] [facility] [address] [place] [address]  Language: English  Hours: Mon - Tue 8:00 AM - 4:30 PM , Wed 8:00 AM - 6:00 PM , Thu - Fri 8:00 AM - 4:30 PM  Fees: Free   Phone: (956) 133-9794 Email: [facility]@co.[facility]. Website: https://www.[facility]./residents/health-medical/clinics-services/mental-health/adult-mental-health     8  Neighborhood Assistance [employer] [facility] [employer]) Distance: 8.14 miles      Phone/Virtual   [address] [place] [address]  Language: English, Slovenian  Hours: Mon - Fri 9:00 AM - 5:00 PM  Fees: Free   Phone: (288) 839-7278 Email: services@[employer] Website: https://www.[employer]     Shelter for families  9   [facility] Distance: 9.43 miles      In-Person   [address] [place] [address]  Language: English  Hours: Mon - Fri 3:00 PM - 9:00 AM , Sat - Sun Open 24 Hours  Fees: Free   Phone: (931) 189-4750 Ext.1 Website: https://www.[facility].org/2020/07/03/emergency-family-shelter/     Shelter for individuals  10  [facility] and Human [facility] - Coordinated Access to Housing and Shelter (CAHS) - Coordinated Access - Emergency housing Distance: 7.86 miles      In-Person, Phone/Virtual   [place] [address]  Language: English  Hours: Mon - Fri 8:00 AM - 4:30 PM  Fees: Free   Phone: (938) 191-1968 Website:  https://www.[facility]./residents/assistance-support/assistance/housing-services-support     11  [facility] - [facility] Distance: 8.55 miles      In-Person   [address] [place] [address]  Language: English  Hours: Mon - Sun 5:00 PM - 10:00 AM  Fees: Free, Self Pay   Phone: (147) 791-9446 Email: info@[employer] Website: https://www.[employer]/locations/[facility]-saint-paul/          Important Numbers & Websites       Emergency Services   911  [facility] Services   311  Poison Control   (576) 736-7931  Suicide Prevention Lifeline   (349) 687-7639 (TALK)  Child Abuse Hotline   (239) 820-4592 (4-A-Child)  Sexual Assault Hotline   (703) 765-8120 (HOPE)  National Runaway Safeline   (554) 216-6499 (RUNAWAY)  All-Options Talkline   (121) 450-4483  Substance Abuse Referral   (754) 181-6632 (HELP)

## 2023-12-20 LAB
ALBUMIN SERPL BCG-MCNC: 4.9 G/DL (ref 3.5–5.2)
ALP SERPL-CCNC: 52 U/L (ref 40–150)
ALT SERPL W P-5'-P-CCNC: 31 U/L (ref 0–70)
ANION GAP SERPL CALCULATED.3IONS-SCNC: 11 MMOL/L (ref 7–15)
AST SERPL W P-5'-P-CCNC: 19 U/L (ref 0–45)
BILIRUB SERPL-MCNC: 0.4 MG/DL
BUN SERPL-MCNC: 12.1 MG/DL (ref 6–20)
CALCIUM SERPL-MCNC: 10.2 MG/DL (ref 8.6–10)
CHLORIDE SERPL-SCNC: 103 MMOL/L (ref 98–107)
CHOLEST SERPL-MCNC: 263 MG/DL
CREAT SERPL-MCNC: 0.93 MG/DL (ref 0.67–1.17)
DEPRECATED HCO3 PLAS-SCNC: 28 MMOL/L (ref 22–29)
EGFRCR SERPLBLD CKD-EPI 2021: >90 ML/MIN/1.73M2
FASTING STATUS PATIENT QL REPORTED: YES
GLUCOSE SERPL-MCNC: 90 MG/DL (ref 70–99)
HBV SURFACE AB SERPL IA-ACNC: <3.5 M[IU]/ML
HBV SURFACE AB SERPL IA-ACNC: NONREACTIVE M[IU]/ML
HDLC SERPL-MCNC: 61 MG/DL
HIV 1+2 AB+HIV1 P24 AG SERPL QL IA: NONREACTIVE
LDLC SERPL CALC-MCNC: 169 MG/DL
NONHDLC SERPL-MCNC: 202 MG/DL
POTASSIUM SERPL-SCNC: 4.3 MMOL/L (ref 3.4–5.3)
PROT SERPL-MCNC: 7.5 G/DL (ref 6.4–8.3)
SODIUM SERPL-SCNC: 142 MMOL/L (ref 135–145)
TRIGL SERPL-MCNC: 166 MG/DL

## 2023-12-22 NOTE — RESULT ENCOUNTER NOTE
The 10-year ASCVD risk score (Jenni PEGUERO, et al., 2019) is: 2%  Patient updated by TVplus message with lab results.       Manav Fishman,  Thanks again for coming into the clinic.  It was nice to see you.  Your lab results have returned.  Metabolic panel looks fine.  Cholesterol labs are mildly elevated.  We no longer treat just to the numbers, rather estimated risk of cardiovascular disease.  Your risk is low.  Keep working on a heart healthy and nutrient dense diet and regular physical activity to control your cholesterol.  You are not immune to hepatitis B.  You can consider vaccination if interested.  Please reach out with follow-up questions or concerns.    Joy Garvin, DO

## 2024-10-11 ENCOUNTER — ALLIED HEALTH/NURSE VISIT (OUTPATIENT)
Dept: FAMILY MEDICINE | Facility: CLINIC | Age: 44
End: 2024-10-11
Payer: COMMERCIAL

## 2024-10-11 DIAGNOSIS — Z23 NEED FOR VACCINATION: Primary | ICD-10-CM

## 2024-10-11 PROCEDURE — 90656 IIV3 VACC NO PRSV 0.5 ML IM: CPT

## 2024-10-11 PROCEDURE — 99207 PR NO CHARGE NURSE ONLY: CPT

## 2024-10-11 PROCEDURE — 90471 IMMUNIZATION ADMIN: CPT

## 2024-11-19 ENCOUNTER — PATIENT OUTREACH (OUTPATIENT)
Dept: CARE COORDINATION | Facility: CLINIC | Age: 44
End: 2024-11-19
Payer: COMMERCIAL

## 2024-12-03 ENCOUNTER — PATIENT OUTREACH (OUTPATIENT)
Dept: CARE COORDINATION | Facility: CLINIC | Age: 44
End: 2024-12-03
Payer: COMMERCIAL

## 2025-02-02 ENCOUNTER — HEALTH MAINTENANCE LETTER (OUTPATIENT)
Age: 45
End: 2025-02-02

## 2025-06-17 ENCOUNTER — PATIENT OUTREACH (OUTPATIENT)
Dept: CARE COORDINATION | Facility: CLINIC | Age: 45
End: 2025-06-17
Payer: COMMERCIAL